# Patient Record
Sex: FEMALE | Race: BLACK OR AFRICAN AMERICAN
[De-identification: names, ages, dates, MRNs, and addresses within clinical notes are randomized per-mention and may not be internally consistent; named-entity substitution may affect disease eponyms.]

---

## 2019-01-23 ENCOUNTER — HOSPITAL ENCOUNTER (OUTPATIENT)
Dept: HOSPITAL 74 - JASU-SURG | Age: 34
Discharge: HOME | End: 2019-01-23
Attending: OBSTETRICS & GYNECOLOGY
Payer: COMMERCIAL

## 2019-01-23 VITALS — HEART RATE: 89 BPM | DIASTOLIC BLOOD PRESSURE: 82 MMHG | TEMPERATURE: 97.9 F | SYSTOLIC BLOOD PRESSURE: 139 MMHG

## 2019-01-23 VITALS — BODY MASS INDEX: 33.5 KG/M2

## 2019-01-23 DIAGNOSIS — D25.0: Primary | ICD-10-CM

## 2019-01-23 PROCEDURE — 0UJD8ZZ INSPECTION OF UTERUS AND CERVIX, VIA NATURAL OR ARTIFICIAL OPENING ENDOSCOPIC: ICD-10-PCS | Performed by: OBSTETRICS & GYNECOLOGY

## 2019-01-23 PROCEDURE — 0UDB7ZX EXTRACTION OF ENDOMETRIUM, VIA NATURAL OR ARTIFICIAL OPENING, DIAGNOSTIC: ICD-10-PCS | Performed by: OBSTETRICS & GYNECOLOGY

## 2019-01-23 PROCEDURE — 0UB98ZZ EXCISION OF UTERUS, VIA NATURAL OR ARTIFICIAL OPENING ENDOSCOPIC: ICD-10-PCS | Performed by: OBSTETRICS & GYNECOLOGY

## 2019-01-23 NOTE — OP
Operative Note





- Note:


Operative Date: 01/23/19


Pre-Operative Diagnosis: submucosal leiomyoma


Operation: hysteroscopic myomectomy, suction D&C


Post-Operative Diagnosis: Same as Pre-op


Surgeon: Kaylan Glass


Anesthesiologist/CRNA: Lucina Cruz MD


Anesthesia: MAC


Specimens Removed: uterine fibroid, endometrial curettings


Estimated Blood Loss (mls): 5


Operative Report Dictated: Yes

## 2019-01-23 NOTE — HP
History & Physical Update





- History


History: No Change





- Physical


Physical: No Change





- Assessment


Assessment: No Change





- Plan


Plan: No Change (Agree with H&P from 1/22/19 - for hysteroscopic myomectomy for 

submucosal fibroid)

## 2019-01-25 NOTE — PATH
Surgical Pathology Report



Patient Name:  ANSON WILLIAMSON

Accession #:  

Med. Rec. #:  [id number]                                                        

   /Age/Gender:  1985 (Age: 33) / F

Account:  [id number]                                                          

             Location: [facility] SURGICAL

Taken:  2019

Received:  2019

Reported:  2019

Physicians:  Kaylan Glass M.D.

  



Specimen(s) Received

 UTERINE FIBROIDS 





Clinical History

Fibroids







Final Diagnosis

FIBROID, HYSTEROSCOPIC MYOMECTOMY AND SUCTION DILATION AND CURETTAGE:

5G FRAGMENTS OF LEIOMYOMA, SECRETORY ENDOMETRIUM, AND BENIGN CERVICAL SQUAMOUS

MUCOSA.





***Electronically Signed***

Ana Pagan M.D.





Gross Description

Received in formalin labeled "fibroid," is a 5 g, 6.5 x 4.0 x 0.4 cm aggregate

of multiple tan, irregular, firm to rubbery tissue fragments admixed with soft

tissue and blood clot. The specimen is entirely submitted in 5 cassettes.

/2019



saudi

## 2019-01-25 NOTE — OP
DATE OF OPERATION:  01/23/2019

 

PREOPERATIVE DIAGNOSIS:  Submucosal uterine fibroid.  

 

POSTOPERATIVE DIAGNOSIS:  Submucosal uterine fibroid.  

 

PROCEDURE:  Hysteroscopic myomectomy, suction dilatation and curettage.  

 

SURGEON:  Kaylan Glass MD

 

ANESTHESIA:  MAC.

 

ANESTHESIOLOGIST:  Lucina Cruz MD.  

 

COMPLICATIONS:  None.

 

SPECIMENS REMOVED:  Uterine fibroid and endometrial curettings.  

 

ESTIMATED BLOOD LOSS:  5 mL.

 

COUNTS:  Sponge, needle, and instrument counts correct.  

 

DISPOSITION:  Stable to PACU.  

 

BRIEF HISTORY AND PROCEDURE:  Patient is a 33-year-old female who had been seen in

the office, and on ultrasound examination was found to have a submucosal uterine

fibroid.  The patient was counseled on her options and elected to undergo

hysteroscopic resection of the fibroid.  Patient signed consents for the procedure in

the office.  

 

She was then admitted to [facility] on January 23, 2019, where consents were

reconfirmed.  Patient was taken to the operating room, given MAC anesthesia, and

placed in the dorsal lithotomy position.  A hard time-out was performed.  She was

prepped and draped in the usual sterile fashion.  

 

A speculum was placed inside the vagina, and the anterior lip of the cervix was

grasped with a tenaculum, and the cervix was dilated to accommodate an operative

hysteroscope, which was advanced to the fundus of the uterus.  An approximately 2-cm

fundal uterine fibroid was appreciated and resected in several passes with the

Versapoint bipolar cautery device.  This was done without difficulty.  

 

Suction dilatation and curettage was then performed to remove all the uterine fibroid

fragments.  One final look with the camera revealed no evidence of uterine

perforation or trauma.  

 

All instruments were then removed from the vagina.  Sponge and instrument counts were

reported to be correct.  The patient was awoken from anesthesia, recovering in stable

condition in the PACU after the procedure.  

 

 

KAYLAN GLASS DO

 

/[id number]

DD: 01/25/2019 09:02

DT: 01/25/2019 09:42

Job #:  [id number]